# Patient Record
Sex: MALE | Race: WHITE | NOT HISPANIC OR LATINO | URBAN - METROPOLITAN AREA
[De-identification: names, ages, dates, MRNs, and addresses within clinical notes are randomized per-mention and may not be internally consistent; named-entity substitution may affect disease eponyms.]

---

## 2022-06-01 ENCOUNTER — OFFICE VISIT (OUTPATIENT)
Dept: URGENT CARE | Facility: CLINIC | Age: 42
End: 2022-06-01
Payer: COMMERCIAL

## 2022-06-01 VITALS
OXYGEN SATURATION: 99 % | HEART RATE: 63 BPM | BODY MASS INDEX: 29.4 KG/M2 | RESPIRATION RATE: 14 BRPM | TEMPERATURE: 98.1 F | WEIGHT: 210 LBS | HEIGHT: 71 IN

## 2022-06-01 DIAGNOSIS — L23.7 POISON IVY: Primary | ICD-10-CM

## 2022-06-01 PROCEDURE — G0382 LEV 3 HOSP TYPE B ED VISIT: HCPCS | Performed by: PHYSICIAN ASSISTANT

## 2022-06-01 RX ORDER — PREDNISONE 10 MG/1
TABLET ORAL
Qty: 21 TABLET | Refills: 0 | Status: SHIPPED | OUTPATIENT
Start: 2022-06-01

## 2022-06-01 NOTE — PATIENT INSTRUCTIONS
Avoid heat exposure    He may take Benadryl as needed    May take Zyrtec daily during the day    Follow-up primary care physician if symptoms persist or worsen

## 2022-06-01 NOTE — PROGRESS NOTES
[facility]        NAME: Chano Garland is a 35 y o  male  : 1980    MRN: [id number]  DATE: 2022  TIME: 7:07 PM    Assessment and Plan   Poison ivy [L23 7]  1  Poison ivy  predniSONE 10 mg tablet         Patient Instructions       Follow up with PCP in 3-5 days  Proceed to  ER if symptoms worsen  Chief Complaint     Chief Complaint   Patient presents with   [facility]     Pt has poison ivy all over his left arm/forearm area is weeping  History of Present Illness       Patient here for evaluation of an issue rash on both forearms  Patient pulling weeds it today and started getting a rash  He did try some topical treatments but the rash is spreading  Poison Ivy        Review of Systems   Review of Systems   Constitutional: Negative  HENT: Negative  Respiratory: Negative  Skin: Positive for rash  Current Medications       Current Outpatient Medications:     predniSONE 10 mg tablet, Six tablets day 1; 5 tablets day 2; 4 tablets day 3; 3 tablets day 4; 2 tablets day 5; and 1 tablet day 6, Disp: 21 tablet, Rfl: 0    Current Allergies     Allergies as of 2022    (No Known Allergies)            The following portions of the patient's history were reviewed and updated as appropriate: allergies, current medications, past family history, past medical history, past social history, past surgical history and problem list      History reviewed  No pertinent past medical history  No past surgical history on file  No family history on file  Medications have been verified  Objective   Pulse 63   Temp 98 1 °F (36 7 °C)   Resp 14   Ht 5' 11" (1 803 m)   Wt 95 3 kg (210 lb)   SpO2 99%   BMI 29 29 kg/m²   No LMP for male patient  Physical Exam     Physical Exam  Vitals and nursing note reviewed  Constitutional:       General: He is not in acute distress  Appearance: Normal appearance  He is well-developed   He is not ill-appearing, toxic-appearing or diaphoretic  HENT:      Head: Normocephalic and atraumatic  Eyes:      Extraocular Movements: Extraocular movements intact  Conjunctiva/sclera: Conjunctivae normal       Pupils: Pupils are equal, round, and reactive to light  Skin:     General: Skin is warm and dry  Comments: Maculopapular vesicular rash on the bilateral upper extremities left greater than right  There is some open weeping areas on the left  No evidence of infection  No deep erythema  Neurological:      General: No focal deficit present  Mental Status: He is alert and oriented to person, place, and time  Psychiatric:         Mood and Affect: Mood normal          Behavior: Behavior normal          Thought Content:  Thought content normal          Judgment: Judgment normal